# Patient Record
Sex: FEMALE | Race: WHITE | NOT HISPANIC OR LATINO | URBAN - METROPOLITAN AREA
[De-identification: names, ages, dates, MRNs, and addresses within clinical notes are randomized per-mention and may not be internally consistent; named-entity substitution may affect disease eponyms.]

---

## 2023-06-07 ENCOUNTER — TELEPHONE (OUTPATIENT)
Dept: PRIMARY CARE | Facility: CLINIC | Age: 38
End: 2023-06-07
Payer: COMMERCIAL

## 2023-06-14 ENCOUNTER — OFFICE VISIT (OUTPATIENT)
Dept: PRIMARY CARE | Facility: CLINIC | Age: 38
End: 2023-06-14
Payer: COMMERCIAL

## 2023-06-14 VITALS
DIASTOLIC BLOOD PRESSURE: 68 MMHG | HEIGHT: 66 IN | OXYGEN SATURATION: 98 % | WEIGHT: 229.6 LBS | RESPIRATION RATE: 12 BRPM | BODY MASS INDEX: 36.9 KG/M2 | HEART RATE: 93 BPM | SYSTOLIC BLOOD PRESSURE: 108 MMHG | TEMPERATURE: 97.7 F

## 2023-06-14 DIAGNOSIS — F41.8 MIXED ANXIETY DEPRESSIVE DISORDER: Primary | ICD-10-CM

## 2023-06-14 DIAGNOSIS — R10.13 EPIGASTRIC PAIN: ICD-10-CM

## 2023-06-14 PROBLEM — R63.5 RECENT WEIGHT GAIN: Status: ACTIVE | Noted: 2023-06-14

## 2023-06-14 PROBLEM — G47.9 SLEEP DIFFICULTIES: Status: ACTIVE | Noted: 2023-06-14

## 2023-06-14 PROBLEM — R11.0 NAUSEA IN ADULT: Status: ACTIVE | Noted: 2023-06-14

## 2023-06-14 PROBLEM — R53.83 FATIGUE: Status: ACTIVE | Noted: 2023-06-14

## 2023-06-14 PROBLEM — J30.9 ALLERGIC RHINITIS: Status: ACTIVE | Noted: 2023-06-14

## 2023-06-14 PROBLEM — N94.6 DYSMENORRHEA: Status: ACTIVE | Noted: 2023-06-14

## 2023-06-14 PROBLEM — K59.09 CONSTIPATION, CHRONIC: Status: ACTIVE | Noted: 2023-06-14

## 2023-06-14 PROBLEM — L30.9 ECZEMA: Status: ACTIVE | Noted: 2023-06-14

## 2023-06-14 PROBLEM — F98.8 ADD (ATTENTION DEFICIT DISORDER): Status: ACTIVE | Noted: 2023-06-14

## 2023-06-14 PROBLEM — G47.33 OSA (OBSTRUCTIVE SLEEP APNEA): Status: ACTIVE | Noted: 2023-06-14

## 2023-06-14 PROBLEM — R06.83 SNORING: Status: ACTIVE | Noted: 2023-06-14

## 2023-06-14 PROBLEM — R10.9 ABDOMINAL PAIN: Status: ACTIVE | Noted: 2023-06-14

## 2023-06-14 PROBLEM — R14.0 ABDOMINAL BLOATING: Status: ACTIVE | Noted: 2023-06-14

## 2023-06-14 PROBLEM — R40.0 DAYTIME SOMNOLENCE: Status: ACTIVE | Noted: 2023-06-14

## 2023-06-14 PROCEDURE — 99214 OFFICE O/P EST MOD 30 MIN: CPT | Performed by: NURSE PRACTITIONER

## 2023-06-14 PROCEDURE — 1036F TOBACCO NON-USER: CPT | Performed by: NURSE PRACTITIONER

## 2023-06-14 RX ORDER — BUPROPION HYDROCHLORIDE 300 MG/1
300 TABLET ORAL DAILY
Qty: 90 TABLET | Refills: 1 | Status: SHIPPED | OUTPATIENT
Start: 2023-06-14 | End: 2023-08-07

## 2023-06-14 RX ORDER — BUPROPION HYDROCHLORIDE 150 MG/1
150 TABLET, FILM COATED, EXTENDED RELEASE ORAL 2 TIMES DAILY
Qty: 90 TABLET | Refills: 1 | Status: SHIPPED | OUTPATIENT
Start: 2023-06-14 | End: 2023-08-07

## 2023-06-14 RX ORDER — BUPROPION HYDROCHLORIDE 150 MG/1
TABLET, FILM COATED, EXTENDED RELEASE ORAL
COMMUNITY
Start: 2023-05-08 | End: 2023-06-14 | Stop reason: SDUPTHER

## 2023-06-14 RX ORDER — FAMOTIDINE 40 MG/1
TABLET, FILM COATED ORAL
Qty: 60 TABLET | Refills: 5 | Status: SHIPPED | OUTPATIENT
Start: 2023-06-14 | End: 2023-12-05 | Stop reason: ALTCHOICE

## 2023-06-14 RX ORDER — BUPROPION HYDROCHLORIDE 300 MG/1
300 TABLET ORAL DAILY
COMMUNITY
Start: 2023-05-08 | End: 2023-06-14 | Stop reason: SDUPTHER

## 2023-06-14 ASSESSMENT — LIFESTYLE VARIABLES
HOW MANY STANDARD DRINKS CONTAINING ALCOHOL DO YOU HAVE ON A TYPICAL DAY: PATIENT DOES NOT DRINK
HOW OFTEN DO YOU HAVE A DRINK CONTAINING ALCOHOL: NEVER
AUDIT-C TOTAL SCORE: 0
HOW OFTEN DO YOU HAVE SIX OR MORE DRINKS ON ONE OCCASION: NEVER
SKIP TO QUESTIONS 9-10: 1

## 2023-06-14 ASSESSMENT — PATIENT HEALTH QUESTIONNAIRE - PHQ9
2. FEELING DOWN, DEPRESSED OR HOPELESS: NOT AT ALL
1. LITTLE INTEREST OR PLEASURE IN DOING THINGS: NOT AT ALL
SUM OF ALL RESPONSES TO PHQ9 QUESTIONS 1 & 2: 0

## 2023-06-14 NOTE — PATIENT INSTRUCTIONS
Look at symptoms of irritable bowel   Usually people take pepcid or famodine on a regular basis if you have those symptoms   The body can have functional symptoms that   Follow up when you need or if you want to see someone in gi

## 2023-06-14 NOTE — PROGRESS NOTES
"Subjective   Patient ID: Janna Orourke is a 37 y.o. female who presents for Follow-up (Depression and anxiety).   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 daughter from day care  Her daughter is 4 1/2   She had surgery a month ago   With a had a uterine polyp rfemoved  She had fibromyalgia flare up from the anesthesis   It took longer to recover from the anesthesia    through the [facility]    She went to urgent care a week after the surgery   It triggered anxiety , she thinks that she has recovered from it now   She has been taking 450 mg of bupropion which has been working well for her so far   She does not want to add anything to it now       Review of Systems    Objective   /68   Pulse 93   Temp 36.5 °C (97.7 °F)   Resp 12   Ht 1.676 m (5' 6\")   Wt 104 kg (229 lb 9.6 oz)   SpO2 98%   BMI 37.06 kg/m²      Physical Exam  Vitals and nursing note reviewed.   Constitutional:       Appearance: Normal appearance.   HENT:      Head: Normocephalic.      Nose: Nose normal.   Eyes:      Conjunctiva/sclera: Conjunctivae normal.      Pupils: Pupils are equal, round, and reactive to light.   Cardiovascular:      Rate and Rhythm: Normal rate and regular rhythm.   Pulmonary:      Effort: Pulmonary effort is normal.      Breath sounds: Normal breath sounds.   Abdominal:      General: Abdomen is flat. Bowel sounds are normal.      Palpations: Abdomen is soft.   Musculoskeletal:         General: Normal range of motion.      Cervical back: Normal range of motion and neck supple.   Skin:     General: Skin is warm and dry.   Neurological:      General: No focal deficit present.      Mental Status: She is alert and oriented to person, place, and time. Mental status is at baseline.   Psychiatric:         Mood and Affect: Mood normal.         Behavior: Behavior normal.         Thought Content: Thought content normal.         Judgment: Judgment normal. "       Assessment/Plan   Problem List Items Addressed This Visit       Abdominal pain - Primary    Relevant Medications    famotidine (Pepcid) 40 mg tablet    Other Relevant Orders    H. Pylori Breath Test    Mixed anxiety depressive disorder    Relevant Medications    buPROPion (Zyban) 150 mg 12 hr tablet    buPROPion XL (Wellbutrin XL) 300 mg 24 hr tablet    Other Relevant Orders    Referral to [facility]

## 2023-08-05 DIAGNOSIS — F41.8 MIXED ANXIETY DEPRESSIVE DISORDER: ICD-10-CM

## 2023-08-07 RX ORDER — BUPROPION HYDROCHLORIDE 150 MG/1
TABLET, FILM COATED, EXTENDED RELEASE ORAL
Qty: 90 TABLET | Refills: 1 | Status: SHIPPED | OUTPATIENT
Start: 2023-08-07 | End: 2023-12-05 | Stop reason: ALTCHOICE

## 2023-08-07 RX ORDER — BUPROPION HYDROCHLORIDE 300 MG/1
300 TABLET ORAL DAILY
Qty: 90 TABLET | Refills: 1 | Status: SHIPPED | OUTPATIENT
Start: 2023-08-07 | End: 2023-12-05 | Stop reason: ALTCHOICE

## 2023-12-05 ENCOUNTER — OFFICE VISIT (OUTPATIENT)
Dept: PRIMARY CARE | Facility: CLINIC | Age: 38
End: 2023-12-05
Payer: COMMERCIAL

## 2023-12-05 VITALS
HEIGHT: 66 IN | WEIGHT: 222 LBS | OXYGEN SATURATION: 98 % | HEART RATE: 89 BPM | SYSTOLIC BLOOD PRESSURE: 123 MMHG | BODY MASS INDEX: 35.68 KG/M2 | DIASTOLIC BLOOD PRESSURE: 78 MMHG

## 2023-12-05 DIAGNOSIS — R07.9 CHEST PAIN, UNSPECIFIED TYPE: Primary | ICD-10-CM

## 2023-12-05 DIAGNOSIS — Z13.220 LIPID SCREENING: ICD-10-CM

## 2023-12-05 DIAGNOSIS — R00.0 TACHYCARDIA: ICD-10-CM

## 2023-12-05 DIAGNOSIS — Z23 IMMUNIZATION DUE: ICD-10-CM

## 2023-12-05 DIAGNOSIS — E55.9 MILD VITAMIN D DEFICIENCY: ICD-10-CM

## 2023-12-05 DIAGNOSIS — F41.9 ANXIETY: ICD-10-CM

## 2023-12-05 LAB
25(OH)D3 SERPL-MCNC: 27 NG/ML (ref 30–100)
CHOLEST SERPL-MCNC: 210 MG/DL (ref 0–199)
CHOLESTEROL/HDL RATIO: 3.2
HDLC SERPL-MCNC: 64.7 MG/DL
LDLC SERPL CALC-MCNC: 131 MG/DL
NON HDL CHOLESTEROL: 145 MG/DL (ref 0–149)
TRIGL SERPL-MCNC: 72 MG/DL (ref 0–149)
TSH SERPL-ACNC: 0.62 MIU/L (ref 0.44–3.98)
VLDL: 14 MG/DL (ref 0–40)

## 2023-12-05 PROCEDURE — 90471 IMMUNIZATION ADMIN: CPT | Performed by: STUDENT IN AN ORGANIZED HEALTH CARE EDUCATION/TRAINING PROGRAM

## 2023-12-05 PROCEDURE — 36415 COLL VENOUS BLD VENIPUNCTURE: CPT

## 2023-12-05 PROCEDURE — 82306 VITAMIN D 25 HYDROXY: CPT

## 2023-12-05 PROCEDURE — 1036F TOBACCO NON-USER: CPT | Performed by: STUDENT IN AN ORGANIZED HEALTH CARE EDUCATION/TRAINING PROGRAM

## 2023-12-05 PROCEDURE — 90686 IIV4 VACC NO PRSV 0.5 ML IM: CPT | Performed by: STUDENT IN AN ORGANIZED HEALTH CARE EDUCATION/TRAINING PROGRAM

## 2023-12-05 PROCEDURE — 80061 LIPID PANEL: CPT

## 2023-12-05 PROCEDURE — 84443 ASSAY THYROID STIM HORMONE: CPT

## 2023-12-05 PROCEDURE — 99204 OFFICE O/P NEW MOD 45 MIN: CPT | Performed by: STUDENT IN AN ORGANIZED HEALTH CARE EDUCATION/TRAINING PROGRAM

## 2023-12-05 RX ORDER — BUPROPION HYDROCHLORIDE 300 MG/1
300 TABLET ORAL EVERY MORNING
Qty: 90 TABLET | Refills: 1 | Status: SHIPPED | OUTPATIENT
Start: 2023-12-05 | End: 2024-06-02

## 2023-12-05 RX ORDER — BUPROPION HYDROCHLORIDE 150 MG/1
150 TABLET ORAL EVERY MORNING
Qty: 90 TABLET | Refills: 1 | Status: SHIPPED | OUTPATIENT
Start: 2023-12-05 | End: 2024-06-02

## 2023-12-05 NOTE — PATIENT INSTRUCTIONS
[email or web address]     Psychiatry referral    Stress Testing  Call [phone] to schedule    Labs in [address] today: Lipid panel, Thyroid study, Vitamin D level    Medication refills sent to your pharmacy     Would advise follow up in ~3 months!    [place],  Dr. Montero

## 2023-12-05 NOTE — PROGRESS NOTES
Subjective   Patient ID: Janna Orourke is a 38 y.o. female who presents for New Patient Visit. She is a patient of Juana Tellez who is coming in with anxiety concerns. She also would like to address her recent, prolonged cough, as well as ongoing chest pain.     HPI   She was seen in [facility] emergency department in late October 2023 with CP and SOB. EKG performed and consistent with NSR, though generous HR in mid-90s. Labs notable for normal blood counts, reassuring electrolytes (, though not fasting), negative troponin.     #Chest Pain  Chest pain with exertion intermittently since march (had had recent procedure, uterine polyp removal, that required anesthesia, that she notes as possible precipitating factor in terms of when chest pain started)   Has been cutting back physically on activity/restricting herself   No family history of EARLY CAD, but grandfather did require CABG in his 60s  No recent lipid panel on file for her   Recent ED visit as above for chest pain with EKG and troponin reassuring  [  ] given prolonged nature of intermittent chest pain along with it being provoked with exertion and some family history, will pursue stress testing, updated lipid panel     #Anxiety  Currently on Wellbutrin 450mg daily, somewhat atypical regimen  Has been on medication on and off since 13  Prior to Wellbutrin, was on Cymbalta that seemed to lose effect after being on for a while   Prozac in the past as well   Zoloft, Paxil, Celexa trials more remotely   Remote psychiatry care but nothing recently   [  ] psychiatry referral  [  ] medication refill, though discussed Wellbutrin as not typically ideal medication for anxiety alone with possibility of provoking some of her cardiac symptoms (though she was on this for several months prior to cardiac symptoms starting)     #Cough  Has 4 year old daughter who was recently ill  Symptoms for last 10-11 days  No hypoxia or profound SOB   Productive cough at  "first that has transitioned to more dry cough  Mild facial pain that has also been improving  No wheezes, crackles, or rhonchi on exam today   [  ] continue supportive care     SOCIAL HISTORY:   - Lives in [place], from [facility] originally   - Lives at home with wife and daughter  - Feels safe at home     FAMILY HISTORY:   - Maternal Grandmother with Afib  - Mother with heart murmur  - Maternal Grandfather with CAD needing CABG     HEALTH MAINTENANCE:  - Flu shot today     Pharmacy: [facility] ([place])     Review of Systems  As above     Objective   /78   Pulse 89   Ht 1.676 m (5' 6\")   Wt 101 kg (222 lb)   SpO2 98%   BMI 35.83 kg/m²     Physical Exam  General: well appearing  female in NAD  HEENT: NCAT, MMM  CV: RRR, borderline tachy, no m/r/g   PULM: CTAB, no wheezes/rhonchi/rales  ABD: Soft, obese, NT, ND  EXT: WWP  NEURO: A&Ox4, no gross motor or sensory deficits  PSYCH: pleasant mood, appropriate affect     Assessment/Plan   Problem List Items Addressed This Visit    None  Visit Diagnoses         Codes    Chest pain, unspecified type    -  Primary R07.9    Relevant Orders    Stress Test    Lipid screening     Z13.220    Relevant Orders    Lipid Panel    Mild vitamin D deficiency     E55.9    Relevant Orders    Vitamin D 25 hydroxy    Tachycardia     R00.0    Relevant Orders    TSH with reflex to Free T4 if abnormal    Anxiety     F41.9    Relevant Medications    buPROPion XL (Wellbutrin XL) 150 mg 24 hr tablet    buPROPion XL (Wellbutrin XL) 300 mg 24 hr tablet    Other Relevant Orders    Referral to Psychiatry    Immunization due     Z23    Relevant Orders    Flu vaccine (IIV4) age 6 months and greater, preservative free          Follow up in 3 months, sooner if acute issues arise.     Benny Montero MD       "

## 2023-12-14 ENCOUNTER — APPOINTMENT (OUTPATIENT)
Dept: PRIMARY CARE | Facility: CLINIC | Age: 38
End: 2023-12-14
Payer: COMMERCIAL

## 2024-07-05 ENCOUNTER — APPOINTMENT (OUTPATIENT)
Dept: PRIMARY CARE | Facility: CLINIC | Age: 39
End: 2024-07-05
Payer: COMMERCIAL

## 2024-07-05 DIAGNOSIS — K59.00 CONSTIPATION, UNSPECIFIED CONSTIPATION TYPE: Primary | ICD-10-CM

## 2024-07-05 DIAGNOSIS — K21.9 GASTROESOPHAGEAL REFLUX DISEASE, UNSPECIFIED WHETHER ESOPHAGITIS PRESENT: ICD-10-CM

## 2024-07-05 PROBLEM — F90.0 ATTENTION DEFICIT HYPERACTIVITY DISORDER (ADHD), PREDOMINANTLY INATTENTIVE TYPE: Status: ACTIVE | Noted: 2023-06-14

## 2024-07-05 PROBLEM — E66.9 OBESITY WITH BODY MASS INDEX 30 OR GREATER: Status: ACTIVE | Noted: 2024-07-05

## 2024-07-05 PROBLEM — E66.01 SEVERE OBESITY (MULTI): Status: ACTIVE | Noted: 2024-07-05

## 2024-07-05 PROCEDURE — 99213 OFFICE O/P EST LOW 20 MIN: CPT | Performed by: STUDENT IN AN ORGANIZED HEALTH CARE EDUCATION/TRAINING PROGRAM

## 2024-07-05 RX ORDER — PANTOPRAZOLE SODIUM 40 MG/1
40 TABLET, DELAYED RELEASE ORAL DAILY
Qty: 90 TABLET | Refills: 0 | Status: SHIPPED | OUTPATIENT
Start: 2024-07-05 | End: 2024-10-03

## 2024-07-05 RX ORDER — POLYETHYLENE GLYCOL 3350 17 G/17G
17 POWDER, FOR SOLUTION ORAL DAILY
Qty: 100 EACH | Refills: 0 | Status: SHIPPED | OUTPATIENT
Start: 2024-07-05 | End: 2024-10-13

## 2024-07-05 ASSESSMENT — ENCOUNTER SYMPTOMS
FREQUENCY: 0
FEVER: 0
HEMATURIA: 0
NAUSEA: 1
ABDOMINAL PAIN: 1
DYSURIA: 0
DIARRHEA: 0
HEMATOCHEZIA: 0
WEIGHT LOSS: 0
HEADACHES: 1
ANOREXIA: 0
MYALGIAS: 1
VOMITING: 0
CONSTIPATION: 1
ARTHRALGIAS: 1
BELCHING: 0
FLATUS: 1

## 2024-07-05 NOTE — PROGRESS NOTES
"Subjective   Patient ID: Janna Orourke is a 38 y.o. female who presents for \"DIGESTIVE PAIN CONCERNS.\" She presents virtually in setting of recent COVID infection (tested positive on 7/3/2024).     Abdominal Pain  This is a chronic problem. The current episode started 1 to 4 weeks ago. The onset quality is undetermined. The problem occurs every several days. The most recent episode lasted 2 days. The problem has been waxing and waning. The pain is located in the generalized abdominal region. The pain is at a severity of 6/10. The quality of the pain is aching, burning, cramping, dull and a sensation of fullness. The abdominal pain radiates to the epigastric region and periumbilical region. Associated symptoms include arthralgias, constipation, flatus, headaches, myalgias and nausea. Pertinent negatives include no anorexia, belching, diarrhea, dysuria, fever, frequency, hematochezia, hematuria, melena, vomiting or weight loss. The pain is aggravated by being still, certain positions, eating and movement. The pain is relieved by Bowel movements, recumbency and vomiting.     Patient notes longstanding history of GERD and Constipation. She has tried dietary modifications including gluten elimination (not presently; when she did in the past, felt it had helped), reduction in meat intake. Regarding constipation, stools on average 2x/week. Has been longstanding. Has done Metamucil in the past without benefit. Currently takes magnesium intermittently to try and help with symptoms. Has not tried other things. No major weight changes or fluctuations. Prior lab workup including CBC, CMP, Celiac Panel, TSH, ESR reassuring (though unclear circumstances at time of these labs; I.e. unknown if she was avoiding gluten). No blood in stool. No fecal incontinence. Is on Wellbutrin, but symptoms have not been impacted or changed when she is on this medication vs. Off. Has remotely done PPI in early adulthood but not in several " years. Mild nausea at times but overall more GERD, constipation. No prior endoscopic evaluation.     PRIOR MEDICAL ISSUES (NOT DISCUSSED TODAY)  #Chest Pain  - prior ED evaluation in 10/2023 for CP and SOB, EKG performed and consistent with NSR, though generous HR in mid-90s. Labs notable for normal blood counts, reassuring electrolytes (, though not fasting), negative troponin    #Anxiety  Currently on Wellbutrin 300mg daily (prior 450mg), somewhat atypical regimen  Has been on medication on and off since 13  Prior to Wellbutrin, was on Cymbalta that seemed to lose effect after being on for a while   Prozac in the past as well   Zoloft, Paxil, Celexa trials more remotely   Prior psychiatry referral     SOCIAL HISTORY:   - Lives in [place], from [facility] originally   - Lives at home with wife and daughter  - Feels safe at home     FAMILY HISTORY:   - Maternal Grandmother with Afib  - Mother with heart murmur  - Maternal Grandfather with CAD needing CABG     Pharmacy: [email or web address] ([place])     Review of Systems   Constitutional:  Negative for fever and weight loss.   Gastrointestinal:  Positive for abdominal pain, constipation, flatus and nausea. Negative for anorexia, diarrhea, hematochezia, melena and vomiting.   Genitourinary:  Negative for dysuria, frequency and hematuria.   Musculoskeletal:  Positive for arthralgias and myalgias.   Neurological:  Positive for headaches.     As above     Objective   There were no vitals taken for this visit.    Physical Exam  Virtual Visit: clear minded, no acute distress, pleasant and engaged in encounter     FROM LAST IN PERSON EVALUATION FOR REFERENCE  General: well appearing  female in NAD  HEENT: NCAT, MMM  CV: RRR, borderline tachy, no m/r/g   PULM: CTAB, no wheezes/rhonchi/rales  ABD: Soft, obese, NT, ND  EXT: WWP  NEURO: A&Ox4, no gross motor or sensory deficits  PSYCH: pleasant mood, appropriate affect     Assessment/Plan   Problem List  Items Addressed This Visit    None  Visit Diagnoses         Codes    Constipation, unspecified constipation type    -  Primary K59.00    Relevant Medications    polyethylene glycol (Glycolax, Miralax) 17 gram packet    Other Relevant Orders    CBC and Auto Differential    C-reactive protein    Sedimentation Rate    Celiac Panel    Comprehensive metabolic panel    Tsh With Reflex To Free T4 If Abnormal    Referral to Gastroenterology    Gastroesophageal reflux disease, unspecified whether esophagitis present     K21.9    Relevant Medications    pantoprazole (ProtoNix) 40 mg EC tablet    Other Relevant Orders    CBC and Auto Differential    C-reactive protein    Sedimentation Rate    Celiac Panel    Comprehensive metabolic panel    Referral to Gastroenterology          Discussed need to try and target current symptoms with first line interventions, notably PPI for GERD and Miralax for constipation. Can also try probiotic. Updated lab screening, including Celiac Panel, while she has recently been consuming gluten products. If she is refractory to many of these initial measures or if any concerning findings on labs, advise GI evaluation to discuss candidacy for/need for luminal evaluation.     From a COVID standpoint, she has mild symptoms, for which I have advised ongoing supportive care.     Benny Montero MD

## 2024-07-18 ENCOUNTER — LAB (OUTPATIENT)
Dept: LAB | Facility: LAB | Age: 39
End: 2024-07-18
Payer: COMMERCIAL

## 2024-07-18 DIAGNOSIS — R79.89 LOW TSH LEVEL: Primary | ICD-10-CM

## 2024-07-18 DIAGNOSIS — K21.9 GASTROESOPHAGEAL REFLUX DISEASE, UNSPECIFIED WHETHER ESOPHAGITIS PRESENT: ICD-10-CM

## 2024-07-18 DIAGNOSIS — K59.00 CONSTIPATION, UNSPECIFIED CONSTIPATION TYPE: ICD-10-CM

## 2024-07-18 LAB
BASOPHILS # BLD AUTO: 0.03 X10*3/UL (ref 0–0.1)
BASOPHILS NFR BLD AUTO: 0.5 %
EOSINOPHIL # BLD AUTO: 0.07 X10*3/UL (ref 0–0.7)
EOSINOPHIL NFR BLD AUTO: 1.2 %
ERYTHROCYTE [DISTWIDTH] IN BLOOD BY AUTOMATED COUNT: 13.7 % (ref 11.5–14.5)
HCT VFR BLD AUTO: 40.4 % (ref 36–46)
HGB BLD-MCNC: 12.8 G/DL (ref 12–16)
IMM GRANULOCYTES # BLD AUTO: 0.01 X10*3/UL (ref 0–0.7)
IMM GRANULOCYTES NFR BLD AUTO: 0.2 % (ref 0–0.9)
LYMPHOCYTES # BLD AUTO: 2.39 X10*3/UL (ref 1.2–4.8)
LYMPHOCYTES NFR BLD AUTO: 42.2 %
MCH RBC QN AUTO: 27.7 PG (ref 26–34)
MCHC RBC AUTO-ENTMCNC: 31.7 G/DL (ref 32–36)
MCV RBC AUTO: 87 FL (ref 80–100)
MONOCYTES # BLD AUTO: 0.37 X10*3/UL (ref 0.1–1)
MONOCYTES NFR BLD AUTO: 6.5 %
NEUTROPHILS # BLD AUTO: 2.79 X10*3/UL (ref 1.2–7.7)
NEUTROPHILS NFR BLD AUTO: 49.4 %
NRBC BLD-RTO: 0 /100 WBCS (ref 0–0)
PLATELET # BLD AUTO: 314 X10*3/UL (ref 150–450)
RBC # BLD AUTO: 4.62 X10*6/UL (ref 4–5.2)
WBC # BLD AUTO: 5.7 X10*3/UL (ref 4.4–11.3)

## 2024-07-18 PROCEDURE — 85652 RBC SED RATE AUTOMATED: CPT

## 2024-07-18 PROCEDURE — 80053 COMPREHEN METABOLIC PANEL: CPT

## 2024-07-18 PROCEDURE — 85025 COMPLETE CBC W/AUTO DIFF WBC: CPT

## 2024-07-18 PROCEDURE — 36415 COLL VENOUS BLD VENIPUNCTURE: CPT

## 2024-07-18 PROCEDURE — 86140 C-REACTIVE PROTEIN: CPT

## 2024-07-18 PROCEDURE — 83516 IMMUNOASSAY NONANTIBODY: CPT

## 2024-07-18 PROCEDURE — 84439 ASSAY OF FREE THYROXINE: CPT

## 2024-07-18 PROCEDURE — 84443 ASSAY THYROID STIM HORMONE: CPT

## 2024-07-18 PROCEDURE — 84480 ASSAY TRIIODOTHYRONINE (T3): CPT

## 2024-07-18 PROCEDURE — 84481 FREE ASSAY (FT-3): CPT

## 2024-07-19 LAB
ALBUMIN SERPL BCP-MCNC: 4.3 G/DL (ref 3.4–5)
ALP SERPL-CCNC: 85 U/L (ref 33–110)
ALT SERPL W P-5'-P-CCNC: 13 U/L (ref 7–45)
ANION GAP SERPL CALC-SCNC: 13 MMOL/L (ref 10–20)
AST SERPL W P-5'-P-CCNC: 18 U/L (ref 9–39)
BILIRUB SERPL-MCNC: 0.3 MG/DL (ref 0–1.2)
BUN SERPL-MCNC: 11 MG/DL (ref 6–23)
CALCIUM SERPL-MCNC: 9.2 MG/DL (ref 8.6–10.6)
CHLORIDE SERPL-SCNC: 106 MMOL/L (ref 98–107)
CO2 SERPL-SCNC: 24 MMOL/L (ref 21–32)
CREAT SERPL-MCNC: 0.75 MG/DL (ref 0.5–1.05)
CRP SERPL-MCNC: <0.1 MG/DL
EGFRCR SERPLBLD CKD-EPI 2021: >90 ML/MIN/1.73M*2
ERYTHROCYTE [SEDIMENTATION RATE] IN BLOOD BY WESTERGREN METHOD: 23 MM/H (ref 0–20)
GLIADIN PEPTIDE IGA SER IA-ACNC: <1 U/ML
GLUCOSE SERPL-MCNC: 96 MG/DL (ref 74–99)
POTASSIUM SERPL-SCNC: 4.4 MMOL/L (ref 3.5–5.3)
PROT SERPL-MCNC: 7.3 G/DL (ref 6.4–8.2)
SODIUM SERPL-SCNC: 139 MMOL/L (ref 136–145)
T3 SERPL-MCNC: 106 NG/DL (ref 60–200)
T3FREE SERPL-MCNC: 3.5 PG/ML (ref 2.3–4.2)
T4 FREE SERPL-MCNC: 1.3 NG/DL (ref 0.78–1.48)
TSH SERPL-ACNC: 0.39 MIU/L (ref 0.44–3.98)
TTG IGA SER IA-ACNC: <1 U/ML

## 2024-07-21 LAB
GLIADIN PEPTIDE IGG SER IA-ACNC: 0.7 FLU (ref 0–4.99)
TTG IGG SER IA-ACNC: <0.82 FLU (ref 0–4.99)

## 2024-08-20 DIAGNOSIS — F41.9 ANXIETY: ICD-10-CM

## 2024-08-20 RX ORDER — BUPROPION HYDROCHLORIDE 300 MG/1
300 TABLET ORAL DAILY
Qty: 90 TABLET | Refills: 1 | Status: SHIPPED | OUTPATIENT
Start: 2024-08-20

## 2024-10-30 ENCOUNTER — APPOINTMENT (OUTPATIENT)
Dept: GASTROENTEROLOGY | Facility: CLINIC | Age: 39
End: 2024-10-30
Payer: COMMERCIAL

## 2024-10-30 VITALS — HEIGHT: 66 IN | WEIGHT: 225 LBS | OXYGEN SATURATION: 98 % | BODY MASS INDEX: 36.16 KG/M2 | HEART RATE: 77 BPM

## 2024-10-30 DIAGNOSIS — K21.9 GASTROESOPHAGEAL REFLUX DISEASE, UNSPECIFIED WHETHER ESOPHAGITIS PRESENT: ICD-10-CM

## 2024-10-30 DIAGNOSIS — K59.00 CONSTIPATION, UNSPECIFIED CONSTIPATION TYPE: ICD-10-CM

## 2024-10-30 PROCEDURE — 1036F TOBACCO NON-USER: CPT | Performed by: INTERNAL MEDICINE

## 2024-10-30 PROCEDURE — 99214 OFFICE O/P EST MOD 30 MIN: CPT | Performed by: INTERNAL MEDICINE

## 2024-10-30 PROCEDURE — 3008F BODY MASS INDEX DOCD: CPT | Performed by: INTERNAL MEDICINE

## 2024-10-30 RX ORDER — LUBIPROSTONE 8 UG/1
8 CAPSULE ORAL 2 TIMES DAILY
Qty: 60 CAPSULE | Refills: 5 | Status: SHIPPED | OUTPATIENT
Start: 2024-10-30

## 2024-10-30 ASSESSMENT — ENCOUNTER SYMPTOMS
FATIGUE: 0
ROS GI COMMENTS: AS PER HPI
ARTHRALGIAS: 0
MYALGIAS: 0
FEVER: 0
DIFFICULTY URINATING: 0
CHILLS: 0
HEADACHES: 0
SHORTNESS OF BREATH: 0
UNEXPECTED WEIGHT CHANGE: 0

## 2024-11-11 DIAGNOSIS — F41.9 ANXIETY: Primary | ICD-10-CM

## 2024-11-11 RX ORDER — HYDROXYZINE HYDROCHLORIDE 25 MG/1
25 TABLET, FILM COATED ORAL EVERY 8 HOURS PRN
Qty: 60 TABLET | Refills: 0 | Status: SHIPPED | OUTPATIENT
Start: 2024-11-11 | End: 2024-12-01

## 2024-12-17 ENCOUNTER — APPOINTMENT (OUTPATIENT)
Dept: PRIMARY CARE | Facility: CLINIC | Age: 39
End: 2024-12-17
Payer: COMMERCIAL

## 2024-12-17 VITALS
OXYGEN SATURATION: 100 % | SYSTOLIC BLOOD PRESSURE: 123 MMHG | BODY MASS INDEX: 34.91 KG/M2 | DIASTOLIC BLOOD PRESSURE: 83 MMHG | WEIGHT: 222.4 LBS | HEIGHT: 67 IN | HEART RATE: 72 BPM

## 2024-12-17 DIAGNOSIS — F41.9 ANXIETY: Primary | ICD-10-CM

## 2024-12-17 DIAGNOSIS — Z13.220 LIPID SCREENING: ICD-10-CM

## 2024-12-17 DIAGNOSIS — Z12.4 CERVICAL CANCER SCREENING: ICD-10-CM

## 2024-12-17 DIAGNOSIS — E55.9 MILD VITAMIN D DEFICIENCY: ICD-10-CM

## 2024-12-17 DIAGNOSIS — R79.89 LOW TSH LEVEL: ICD-10-CM

## 2024-12-17 PROCEDURE — 99214 OFFICE O/P EST MOD 30 MIN: CPT | Performed by: STUDENT IN AN ORGANIZED HEALTH CARE EDUCATION/TRAINING PROGRAM

## 2024-12-17 PROCEDURE — 1036F TOBACCO NON-USER: CPT | Performed by: STUDENT IN AN ORGANIZED HEALTH CARE EDUCATION/TRAINING PROGRAM

## 2024-12-17 PROCEDURE — 3008F BODY MASS INDEX DOCD: CPT | Performed by: STUDENT IN AN ORGANIZED HEALTH CARE EDUCATION/TRAINING PROGRAM

## 2024-12-17 RX ORDER — DULOXETIN HYDROCHLORIDE 30 MG/1
30 CAPSULE, DELAYED RELEASE ORAL 2 TIMES DAILY
Qty: 90 CAPSULE | Refills: 1 | Status: SHIPPED | OUTPATIENT
Start: 2024-12-17 | End: 2025-06-15

## 2024-12-17 ASSESSMENT — PATIENT HEALTH QUESTIONNAIRE - PHQ9
1. LITTLE INTEREST OR PLEASURE IN DOING THINGS: SEVERAL DAYS
SUM OF ALL RESPONSES TO PHQ9 QUESTIONS 1 AND 2: 2
10. IF YOU CHECKED OFF ANY PROBLEMS, HOW DIFFICULT HAVE THESE PROBLEMS MADE IT FOR YOU TO DO YOUR WORK, TAKE CARE OF THINGS AT HOME, OR GET ALONG WITH OTHER PEOPLE: SOMEWHAT DIFFICULT
2. FEELING DOWN, DEPRESSED OR HOPELESS: SEVERAL DAYS

## 2024-12-17 ASSESSMENT — PAIN SCALES - GENERAL: PAINLEVEL_OUTOF10: 0-NO PAIN

## 2024-12-30 ENCOUNTER — LAB (OUTPATIENT)
Dept: LAB | Facility: LAB | Age: 39
End: 2024-12-30
Payer: COMMERCIAL

## 2024-12-30 DIAGNOSIS — E55.9 MILD VITAMIN D DEFICIENCY: ICD-10-CM

## 2024-12-30 DIAGNOSIS — R79.89 LOW TSH LEVEL: ICD-10-CM

## 2024-12-30 DIAGNOSIS — Z13.220 LIPID SCREENING: ICD-10-CM

## 2024-12-30 LAB
25(OH)D3 SERPL-MCNC: 30 NG/ML (ref 30–100)
CHOLEST SERPL-MCNC: 259 MG/DL (ref 0–199)
CHOLESTEROL/HDL RATIO: 3.4
HDLC SERPL-MCNC: 75.2 MG/DL
LDLC SERPL CALC-MCNC: 158 MG/DL
NON HDL CHOLESTEROL: 184 MG/DL (ref 0–149)
TRIGL SERPL-MCNC: 131 MG/DL (ref 0–149)
TSH SERPL-ACNC: 0.87 MIU/L (ref 0.44–3.98)
VLDL: 26 MG/DL (ref 0–40)

## 2024-12-30 PROCEDURE — 84443 ASSAY THYROID STIM HORMONE: CPT

## 2024-12-30 PROCEDURE — 80061 LIPID PANEL: CPT

## 2024-12-30 PROCEDURE — 82306 VITAMIN D 25 HYDROXY: CPT

## 2025-01-03 DIAGNOSIS — F41.9 ANXIETY: ICD-10-CM

## 2025-01-03 RX ORDER — HYDROXYZINE HYDROCHLORIDE 25 MG/1
25 TABLET, FILM COATED ORAL EVERY 8 HOURS PRN
Qty: 60 TABLET | Refills: 3 | Status: SHIPPED | OUTPATIENT
Start: 2025-01-03 | End: 2025-03-24

## 2025-03-07 DIAGNOSIS — F41.9 ANXIETY: ICD-10-CM

## 2025-03-08 RX ORDER — BUPROPION HYDROCHLORIDE 300 MG/1
300 TABLET ORAL DAILY
Qty: 90 TABLET | Refills: 1 | Status: SHIPPED | OUTPATIENT
Start: 2025-03-08

## 2025-04-12 DIAGNOSIS — F41.9 ANXIETY: ICD-10-CM

## 2025-04-13 RX ORDER — DULOXETIN HYDROCHLORIDE 30 MG/1
30 CAPSULE, DELAYED RELEASE ORAL 2 TIMES DAILY
Qty: 180 CAPSULE | Refills: 1 | Status: SHIPPED | OUTPATIENT
Start: 2025-04-13 | End: 2025-10-10

## 2025-05-06 DIAGNOSIS — F41.9 ANXIETY: ICD-10-CM

## 2025-05-06 RX ORDER — DULOXETIN HYDROCHLORIDE 30 MG/1
30 CAPSULE, DELAYED RELEASE ORAL 2 TIMES DAILY
Qty: 180 CAPSULE | Refills: 1 | Status: SHIPPED | OUTPATIENT
Start: 2025-05-06 | End: 2025-11-02

## 2025-05-06 RX ORDER — BUPROPION HYDROCHLORIDE 300 MG/1
300 TABLET ORAL DAILY
Qty: 90 TABLET | Refills: 1 | Status: SHIPPED | OUTPATIENT
Start: 2025-05-06

## 2025-06-24 ENCOUNTER — OFFICE VISIT (OUTPATIENT)
Dept: OBSTETRICS AND GYNECOLOGY | Facility: CLINIC | Age: 40
End: 2025-06-24
Payer: COMMERCIAL

## 2025-06-24 DIAGNOSIS — Z12.31 BREAST CANCER SCREENING BY MAMMOGRAM: ICD-10-CM

## 2025-06-24 DIAGNOSIS — Z12.4 CERVICAL CANCER SCREENING: ICD-10-CM

## 2025-06-24 DIAGNOSIS — N94.6 DYSMENORRHEA: ICD-10-CM

## 2025-06-24 DIAGNOSIS — Z01.419 WELL WOMAN EXAM: Primary | ICD-10-CM

## 2025-06-24 PROCEDURE — 99385 PREV VISIT NEW AGE 18-39: CPT | Performed by: NURSE PRACTITIONER

## 2025-06-24 PROCEDURE — 88175 CYTOPATH C/V AUTO FLUID REDO: CPT | Mod: TC,GCY | Performed by: NURSE PRACTITIONER

## 2025-06-24 PROCEDURE — 87626 HPV SEP HI-RSK TYP&POOL RSLT: CPT | Performed by: NURSE PRACTITIONER

## 2025-06-24 ASSESSMENT — ENCOUNTER SYMPTOMS
OCCASIONAL FEELINGS OF UNSTEADINESS: 0
LOSS OF SENSATION IN FEET: 0
DEPRESSION: 0

## 2025-06-24 NOTE — PROGRESS NOTES
Subjective   Janna Orourke is a 39 y.o. female who is here for a routine exam.     - G0  - Not currently doing infertility treatments.   - Patient has hx of pain with speculum use.   - Menses are regular, every 25 days, bleeding lasting 4-7 days.   - Endorses cramping with her periods and 2 heavy days of bleeding with some clots. The clots are about 50 cents piece in size. She would like to know why she is having intense menstrual cramps.   - Pap UTD? She reports not having a pap in over 5 years.   - She has hx of undergoing infertility treatments. Patient currently has a 5 yo. Her wife carried the pregnancy.   - Exercises regularly.   - Maternal grandfather with colon cancer.   - One of her maternal aunts dx with breast cancer in her 60s. The other maternal aunt is currently being evaluated for possible breast cancer at age 59. She doesn't think anyone in her family had genetic testing.   - Pt is interested in getting her MMG early.   - She was concerned about perimenopause because she has thinning hair. Pt denies thyroid issues.   - Mother and sister with endometriosis hx.          Objective   Physical Exam  Constitutional:       General: She is not in acute distress.     Appearance: Normal appearance. She is not ill-appearing.   Genitourinary:      Urethral meatus normal.      No vaginal atrophy present.       Right Adnexa: not tender and no mass present.     Left Adnexa: not tender and no mass present.     Cervix is not absent.      Uterus is not absent.  Breasts:     Right: Normal.      Left: Normal.   Neurological:      Mental Status: She is alert.         Assessment/Plan   39 y.o. female being assessed for an annual Gyn exam. Co morbidities: fibromyalgia, ADHD, depression, anxiety.     Diagnoses:   #1 Annual GYN exam     Plan:   1. Annual GYN exam    - Pap was collected.   - Pelvic exam was completed.   - Ordered MMG per patient request. She does have family hx of breast cancer.   - A pelvic U/S was  ordered to further evaluate her dysmenorrhea.   - Discussed that perimenopause is unlikely given the patient's age and regular menstrual cycles. We reviewed antidepressants can cause both hair thinning and weight changes.      Follow-up in 1 year with [facility] NP for annual GYN exam.     Scribe Attestation:   Lidia ALCARAZ, jamila scribing for virtually, and in the presence of ELIZABETH Johnson on 06/24/2025 at 10:35 AM.     SPEKE audio duration: 19 minutes    I spent a total of eConsult Time: 25 minutes in face to face and non face to face time.   I, ELIZABETH Johnson, personally performed the services described in this documentation which was scribed virtually and I confirm that it is both accurate and complete.     ELIZABETH Johnson

## 2025-07-08 ENCOUNTER — HOSPITAL ENCOUNTER (OUTPATIENT)
Dept: RADIOLOGY | Facility: HOSPITAL | Age: 40
Discharge: HOME | End: 2025-07-08
Payer: COMMERCIAL

## 2025-07-08 ENCOUNTER — HOSPITAL ENCOUNTER (OUTPATIENT)
Dept: RADIOLOGY | Facility: CLINIC | Age: 40
Discharge: HOME | End: 2025-07-08
Payer: COMMERCIAL

## 2025-07-08 DIAGNOSIS — N94.6 DYSMENORRHEA: ICD-10-CM

## 2025-07-08 DIAGNOSIS — Z12.31 BREAST CANCER SCREENING BY MAMMOGRAM: ICD-10-CM

## 2025-07-08 LAB
CYTOLOGY CMNT CVX/VAG CYTO-IMP: NORMAL
HPV HR 12 DNA GENITAL QL NAA+PROBE: NEGATIVE
HPV HR GENOTYPES PNL CVX NAA+PROBE: NEGATIVE
HPV16 DNA SPEC QL NAA+PROBE: NEGATIVE
HPV18 DNA SPEC QL NAA+PROBE: NEGATIVE
LAB AP HPV GENOTYPE QUESTION: YES
LAB AP HPV HR: NORMAL
LABORATORY COMMENT REPORT: NORMAL
LMP START DATE: NORMAL
MENSTRUAL HX REPORTED: NORMAL
PATH REPORT.TOTAL CANCER: NORMAL

## 2025-07-08 PROCEDURE — 77067 SCR MAMMO BI INCL CAD: CPT

## 2025-07-08 PROCEDURE — 76856 US EXAM PELVIC COMPLETE: CPT | Performed by: RADIOLOGY

## 2025-07-08 PROCEDURE — 77063 BREAST TOMOSYNTHESIS BI: CPT | Performed by: RADIOLOGY

## 2025-07-08 PROCEDURE — 77067 SCR MAMMO BI INCL CAD: CPT | Performed by: RADIOLOGY

## 2025-07-08 PROCEDURE — 76830 TRANSVAGINAL US NON-OB: CPT | Performed by: RADIOLOGY

## 2025-07-08 PROCEDURE — 76856 US EXAM PELVIC COMPLETE: CPT

## 2025-07-09 DIAGNOSIS — R92.1 BREAST CALCIFICATION, LEFT: Primary | ICD-10-CM

## 2025-07-11 ENCOUNTER — HOSPITAL ENCOUNTER (OUTPATIENT)
Dept: RADIOLOGY | Facility: CLINIC | Age: 40
Discharge: HOME | End: 2025-07-11
Payer: COMMERCIAL

## 2025-07-11 DIAGNOSIS — R92.1 BREAST CALCIFICATIONS ON MAMMOGRAM: ICD-10-CM

## 2025-07-11 DIAGNOSIS — R92.1 BREAST CALCIFICATION, LEFT: ICD-10-CM

## 2025-07-11 DIAGNOSIS — R92.1 BREAST CALCIFICATIONS ON MAMMOGRAM: Primary | ICD-10-CM

## 2025-07-11 PROCEDURE — 77065 DX MAMMO INCL CAD UNI: CPT | Mod: LT

## 2025-07-11 PROCEDURE — 77065 DX MAMMO INCL CAD UNI: CPT | Mod: LEFT SIDE | Performed by: RADIOLOGY

## 2025-07-25 ENCOUNTER — APPOINTMENT (OUTPATIENT)
Dept: PRIMARY CARE | Facility: CLINIC | Age: 40
End: 2025-07-25
Payer: COMMERCIAL

## 2025-07-25 VITALS
DIASTOLIC BLOOD PRESSURE: 69 MMHG | BODY MASS INDEX: 36.81 KG/M2 | SYSTOLIC BLOOD PRESSURE: 116 MMHG | OXYGEN SATURATION: 98 % | HEART RATE: 74 BPM | WEIGHT: 235 LBS

## 2025-07-25 DIAGNOSIS — E66.9 OBESITY (BMI 30-39.9): ICD-10-CM

## 2025-07-25 DIAGNOSIS — Z13.0 SCREENING FOR DEFICIENCY ANEMIA: ICD-10-CM

## 2025-07-25 DIAGNOSIS — K21.9 GASTROESOPHAGEAL REFLUX DISEASE, UNSPECIFIED WHETHER ESOPHAGITIS PRESENT: ICD-10-CM

## 2025-07-25 DIAGNOSIS — R00.0 TACHYCARDIA: ICD-10-CM

## 2025-07-25 DIAGNOSIS — Z13.220 LIPID SCREENING: ICD-10-CM

## 2025-07-25 DIAGNOSIS — R79.89 LOW TSH LEVEL: ICD-10-CM

## 2025-07-25 DIAGNOSIS — E55.9 MILD VITAMIN D DEFICIENCY: ICD-10-CM

## 2025-07-25 DIAGNOSIS — R40.0 DAYTIME SOMNOLENCE: ICD-10-CM

## 2025-07-25 DIAGNOSIS — Z00.00 HEALTH MAINTENANCE EXAMINATION: Primary | ICD-10-CM

## 2025-07-25 DIAGNOSIS — F41.9 ANXIETY: ICD-10-CM

## 2025-07-25 PROCEDURE — 99395 PREV VISIT EST AGE 18-39: CPT | Performed by: STUDENT IN AN ORGANIZED HEALTH CARE EDUCATION/TRAINING PROGRAM

## 2025-07-25 RX ORDER — NALTREXONE HYDROCHLORIDE 50 MG/1
TABLET, FILM COATED ORAL
Qty: 87 TABLET | Refills: 0 | Status: SHIPPED | OUTPATIENT
Start: 2025-07-25 | End: 2025-10-23

## 2025-07-25 RX ORDER — DULOXETIN HYDROCHLORIDE 60 MG/1
60 CAPSULE, DELAYED RELEASE ORAL DAILY
Qty: 90 CAPSULE | Refills: 3 | Status: SHIPPED | OUTPATIENT
Start: 2025-07-25 | End: 2026-07-20

## 2025-07-25 RX ORDER — BUPROPION HYDROCHLORIDE 300 MG/1
300 TABLET ORAL DAILY
Qty: 90 TABLET | Refills: 3 | Status: SHIPPED | OUTPATIENT
Start: 2025-07-25

## 2025-07-25 RX ORDER — HYDROXYZINE HYDROCHLORIDE 25 MG/1
25 TABLET, FILM COATED ORAL EVERY 8 HOURS PRN
Qty: 60 TABLET | Refills: 3 | Status: SHIPPED | OUTPATIENT
Start: 2025-07-25 | End: 2025-10-13

## 2025-07-25 ASSESSMENT — PAIN SCALES - GENERAL: PAINLEVEL_OUTOF10: 0-NO PAIN

## 2025-07-25 ASSESSMENT — PATIENT HEALTH QUESTIONNAIRE - PHQ9
2. FEELING DOWN, DEPRESSED OR HOPELESS: NOT AT ALL
1. LITTLE INTEREST OR PLEASURE IN DOING THINGS: NOT AT ALL
SUM OF ALL RESPONSES TO PHQ9 QUESTIONS 1 AND 2: 0

## 2025-07-25 ASSESSMENT — ENCOUNTER SYMPTOMS
DEPRESSION: 1
OCCASIONAL FEELINGS OF UNSTEADINESS: 0
LOSS OF SENSATION IN FEET: 0

## 2025-07-25 NOTE — PROGRESS NOTES
Janna Orourke is a 39 y.o. F seen in Clinic at [facility] by Dr. Benny Montero on 07/25/25 for routine care, as well as for management of the following chronic medical conditions: IBS-C, anxiety, obesity. She presents today for CPE.     Chronic Medical Conditions:   #IBS-C  #Constipation  #Abdominal bloating  - follows with GI, Dr. Gupta  - celiac panel negative  - prescribed Amitiza 8 mg BID x1 month 10/2024 with improvement in bloating and constipation; has continued   - omeprazole prn for heartburn; says she does NOT take pantoprazole as prescribed    #Anxiety  - Currently on Wellbutrin 300mg daily (prior 450mg), somewhat atypical regimen  - Has been on medication on and off since 13  - Prior to Wellbutrin, was on Cymbalta that seemed to lose effect after being on for a while but did help  - Zoloft, Paxil, Celexa, Prozac trials more remotely  - Prior psychiatry referral, has missed referral calls  - Recently prescribed atarax prn trial, uses 2, 25 mg tablets before bed  - Describes recent election results and menstrual cycles as biggest anxiety triggers  [  ] Cymbalta at 60mg daily (has been titrated) with good effect  [  ] atarax prn    #Weight Gain, Obesity   #Concern for MELINA   - weight gain given age, stage of life, medications, etc  - interested in more aggressive weight management   [  ] lifestyle discussion   [  ] on Wellbutrin, add Naltrexone 25mg, then titrate to 50g dosing  [  ] assess for response  [  ] MELINA evaluation given fatigue, daytime somnolence, large neck circumference resulting in elevated STOP BANG score   [  ] updated labs     #Chest Pain  - prior ED evaluation in 10/2023 for CP and SOB, EKG performed and consistent with NSR, though generous HR in mid-90s. Labs notable for normal blood counts, reassuring electrolytes (, though not fasting), negative troponin  - has occasional episodes, not concerning  [  ] CTM      Past Medical History:   Past Medical History:   Diagnosis Date     ADHD (attention deficit hyperactivity disorder)     Anxiety     Depression     GERD (gastroesophageal reflux disease)      Subspecialty Medical Care: GI, GYN     Past Surgical History: endometrial polyp removal  Past Surgical History:   Procedure Laterality Date    MAMMOGRAM DIAGNOSTIC BI Bilateral 07/08/2025    UTERINE FIBROID EMBOLIZATION  03/2023    WISDOM TOOTH EXTRACTION         Medications:    Current Outpatient Medications:     lubiprostone (Amitiza) 8 mcg capsule, Take 1 capsule (8 mcg) by mouth 2 times a day. Take with meals, Disp: 60 capsule, Rfl: 5    buPROPion XL (Wellbutrin XL) 300 mg 24 hr tablet, Take 1 tablet (300 mg) by mouth once daily. Do not crush, chew, or split, Disp: 90 tablet, Rfl: 3    DULoxetine (Cymbalta) 60 mg DR capsule, Take 1 capsule (60 mg) by mouth once daily. Do not crush or chew., Disp: 90 capsule, Rfl: 3    hydrOXYzine HCL (Atarax) 25 mg tablet, Take 1 tablet (25 mg) by mouth every 8 hours if needed for anxiety., Disp: 60 tablet, Rfl: 3    naltrexone (Depade) 50 mg tablet, Take 0.5 tablets (25 mg) by mouth once daily for 7 days, THEN 1 tablet (50 mg) once daily., Disp: 87 tablet, Rfl: 0  Pharmacy: [facility] in [place]    Allergies:   Allergies   Allergen Reactions    Sulfamethoxazole-Trimethoprim Headache    Amoxicillin Rash     Family History:   - Maternal Grandmother with Afib  - Mother with heart murmur, 2nd time with melanoma  - Maternal Grandfather with CAD needing CABG   - Paternal grandfather with mesothelioma  - Paternal grandmother with bladder cancer  Family History   Problem Relation Name Age of Onset    Breast cancer Mother's Sister Aunt MM 60 - 69     Social History:   Home/Living Situation/Falls/Safety Assessment: lives at home with wife and daughter  Education/Employment/Work/Vocational: , 3 years  Activities: hiking  Drug Use: prior smoker, quit 10 years ago, 1 pack could last her a few days  Minimal alcohol use  Medical marijuana occasionally    Diet: no concerns  Depression/Anxiety: wellbutrin   Sexuality/Contraception/Menstrual History:   Sleep: no concerns    Patient Information:  Health Insurance: [employer]  Transportation: car  Healthcare POA/Guardian: spouse (Noni)   Contact Information: correct in EMR     Visit Vitals  /69 (BP Location: Left arm, Patient Position: Sitting, BP Cuff Size: Large adult)   Pulse 74   Wt 107 kg (235 lb)   SpO2 98%   BMI 36.81 kg/m²   OB Status Having periods   Smoking Status Former   BSA 2.25 m²      PHYSICAL EXAM:   General: well appearing, NAD, pleasant and engaged in encounter    HEENT: NCAT, MMM  CV: RRR, no m/r/g  PULM: CTAB, non-labored respirations   ABD: soft, NT, ND, + bowel sounds   : no suprapubic or CVA tenderness   EXT: WWP, no significant edema   SKIN: no rashes noted   NEURO: A&Ox4, symmetric facies, no gross motor or sensory deficits, normal gait  PSYCH: pleasant mood, appropriate affect     Assessment/Plan    Janna Orourke is a 39 y.o. year old female patient with seen in Clinic at [facility] by Dr. Benny Montero on 7/25/2025 for routine care, as well as for management of the following chronic medical conditions: IBS-C, anxiety, obesity. She presents today for CPE.     Chronic Medical Conditions:   #IBS-C  #Constipation  #Abdominal bloating  - follows with GI, Dr. Gupta  - celiac panel negative  - prescribed Amitiza 8 mg BID x1 month 10/2024 with improvement in bloating and constipation; has continued   - omeprazole prn for heartburn; says she does NOT take pantoprazole as prescribed    #Anxiety  - Currently on Wellbutrin 300mg daily (prior 450mg), somewhat atypical regimen  - Has been on medication on and off since 13  - Prior to Wellbutrin, was on Cymbalta that seemed to lose effect after being on for a while but did help  - Zoloft, Paxil, Celexa, Prozac trials more remotely  - Prior psychiatry referral, has missed referral calls  - Recently prescribed atarax prn trial, uses 2, 25 mg  tablets before bed  - Describes recent election results and menstrual cycles as biggest anxiety triggers  [  ] Cymbalta at 60mg daily (has been titrated) with good effect  [  ] atarax prn    #Weight Gain, Obesity   #Concern for MELINA   - weight gain given age, stage of life, medications, etc  - interested in more aggressive weight management   [  ] lifestyle discussion   [  ] on Wellbutrin, add Naltrexone 25mg, then titrate to 50g dosing  [  ] assess for response  [  ] MELINA evaluation given fatigue, daytime somnolence, large neck circumference resulting in elevated STOP BANG score   [  ] updated labs     #Chest Pain  - prior ED evaluation in 10/2023 for CP and SOB, EKG performed and consistent with NSR, though generous HR in mid-90s. Labs notable for normal blood counts, reassuring electrolytes (, though not fasting), negative troponin  - has occasional episodes, not concerning  [  ] CTM     #Health Maintenance  CPE: 07/2025    Cancer Screening  - Cervical Cancer Screening: last pap smear/HPV testing: last 06/2025 with negative HPV co-testing   - Mammography: due 07/2026  - Colorectal Cancer Screening: due at 45   - Lung Cancer Screening: not indicated    Laboratory Screening  - Lipid Screen: moderate DLD; repeat today   - ASCVD Score: calculate at 40   - A1C, glucose screen: fasting glucose 7/2024 reassuring; updated labs today   - STI, HIV, Hep B screen: HIV neg 2022, Hep B surface Ag/core Ab neg 2022  - Hep C screen: neg 2022    Imaging Screening  - Osteoporosis/DEXA screening: due at 65    Immunizations:   - Influenza - annual advised  - COVID - recommend staying UTD with boosters   - Tdap - done 2018, due 2028  - Prevnar, Pneumovax: due at 50  - Shingrix: due at 50     Other Screening  - Health Literacy Assessment: excellent  - Depression screen: Wellbutrin, Cymbalta with good response as above   - Home safety/partner violence screen: negative  - Hearing/Vision screens: negative, corrective lenses   -  Alcohol/tobacco/drug use screen: as above  - Healthcare POA/Advanced Directives: spouse    Referrals: labs, home sleep study, addition of Naltrexone to regimen for weight loss     Return to clinic in 3 months for follow-up.    Patient Discussion:    Please call back the office with any questions at [phone]. In the case of an emergency, please call 911 or go to the nearest Emergency Department.      Benny Montero MD  Internal Medicine-Pediatrics  [facility] [address], [address]  P: [phone], F: [phone]